# Patient Record
Sex: MALE | Race: WHITE
[De-identification: names, ages, dates, MRNs, and addresses within clinical notes are randomized per-mention and may not be internally consistent; named-entity substitution may affect disease eponyms.]

---

## 2019-06-18 ENCOUNTER — HOSPITAL ENCOUNTER (INPATIENT)
Dept: HOSPITAL 74 - YASAS | Age: 39
LOS: 3 days | Discharge: HOME | DRG: 773 | End: 2019-06-21
Attending: SURGERY | Admitting: SURGERY
Payer: COMMERCIAL

## 2019-06-18 VITALS — BODY MASS INDEX: 24.1 KG/M2

## 2019-06-18 DIAGNOSIS — F14.10: ICD-10-CM

## 2019-06-18 DIAGNOSIS — F11.20: ICD-10-CM

## 2019-06-18 DIAGNOSIS — F13.230: ICD-10-CM

## 2019-06-18 DIAGNOSIS — F17.210: ICD-10-CM

## 2019-06-18 DIAGNOSIS — F10.230: Primary | ICD-10-CM

## 2019-06-18 DIAGNOSIS — F31.9: ICD-10-CM

## 2019-06-18 DIAGNOSIS — R55: ICD-10-CM

## 2019-06-18 DIAGNOSIS — F43.10: ICD-10-CM

## 2019-06-18 DIAGNOSIS — F15.10: ICD-10-CM

## 2019-06-18 PROCEDURE — HZ2ZZZZ DETOXIFICATION SERVICES FOR SUBSTANCE ABUSE TREATMENT: ICD-10-PCS | Performed by: SURGERY

## 2019-06-18 RX ADMIN — Medication SCH MG: at 21:47

## 2019-06-18 NOTE — HP
CIWA Score


Nausea/Vomitin-Mild Nausea/No Vomiting


Muscle Tremors: 2


Anxiety: 1-Mildly Anxious


Agitation: 3


Paroxysmal Sweats: 3


Orientation: 0-Oriented


Tacttile Disturbances: 0-None


Auditory Disturbances: 0-None


Visual Disturbances: 0-None


Headache: 2-Mild


CIWA-Ar Total Score: 12





- Admission Criteria


[facility] Guidelines: Admission for Medically Managed Detox: 


Requires at least one of the followin. CIWA greater than 12


2. Seizures within the past 24 hours


3. Delirium tremens within the past 24 hours


4. Hallucinations within the past 24 hours


5. Acute intervention needed for co  occurring medical disorder


6. Acute intervention needed for co  occurring psychiatric disorder


7. Severe withdrawal that cannot be handled at a lower level of care (continued


    vomiting, continued diarrhea, abnormal vital signs) requiring intravenous


    medication and/or fluids


8. Pregnancy





Patient presents the following: CIWA greater than 12


Admission Criteria Met: Admission criteria met





Admission ROS [facility]


Chief Complaint: 





xanax, heroin detox





39 yo with no medical problems, no medications, last here in detox about 4 

years ago. Is in a methadone program. Pt relapsed about 2 months ago after wife 

passed of cancer. Since then has been using heroin, benzo, alcohol, amphetamines

, alcohol. Pt is self employed- computer sales.


Lives in [place] with uncle.








Heroin- 6-8 bags IV for 2 months, in methadone program- 80mg/day


Alcohol- 6 pack 1-2 a day, no seizures, no DT's


amphetamines- occ use 1/2 gram a day


benzo- xanax/klonopin- $10-15/day





DUR- no current meds








Allergies/Adverse Reactions: 


 Allergies











Allergy/AdvReac Type Severity Reaction Status Date / Time


 


Penicillins Allergy Unknown  Verified 19 18:59














- Ebola screening


Have you traveled outside of the country in the last 21 days: No


Have you had contact with anyone from an Ebola affected area: No





Patient History





- Patient Medical History


Hx Anemia: No


Hx Asthma: No


Hx Chronic Obstructive Pulmonary Disease (COPD): No


Hx Cancer: No


Hx Cardiac Disorders: No


Hx Congestive Heart Failure: No


Hx Hypertension: No


Hx Hypercholesterolemia: No


Hx Pacemaker: No


HX Cerebrovascular Accident: No


Hx Seizures: No (but h/o blackouts -several weeks ago)


Hx Dementia: No


Hx Diabetes: No


Hx Gastrointestinal Disorders: No


Hx Liver Disease: No


Hx Genitourinary Disorders: No


Hx Sexually Transmitted Disorders: No


Hx Renal Disease (ESRD): No


Hx Thyroid Disease: No


Hx Human Immunodeficiency Virus (HIV): No


Hx Hepatitis C: No


Hx Depression: Yes (ptsd)


Hx Suicide Attempt: No


Hx Bipolar Disorder: Yes


Hx Schizophrenia: No





- Patient Surgical History


Past Surgical History: Yes


Other Surgical History: rt varicose vein surgery / h/o rt jaw fx 





- PPD History


Documented Results: Negative w/o proof


Date: 05/28/15





- Smoking Cessation


Smoking history: Current every day smoker


Have you smoked in the past 12 months: Yes


Aproximately how many cigarettes per day: 20


Cigars Per Day: 0


Hx Chewing Tobacco Use: No


Initiated information on smoking cessation: Yes


'Breaking Loose' booklet given: 19





- Substance & Tx. History


Hx Alcohol Use: Yes


Hx Substance Use: Yes


Substance Use Type: Alcohol, Heroin, Opiates, Tranquilizers





- Substances abused


  ** Alcohol


Substance route: Oral


Frequency: Daily


Amount used: 2 6packs daily


Age of first use: 14


Date of last use: 19





  ** Alprazolam (Xanax)


Substance route: Oral


Frequency: Daily


Amount used: $10.00


Age of first use: 25


Date of last use: 19





  ** Heroin


Substance route: Injection


Frequency: Daily


Amount used: $60.00


Age of first use: 25


Date of last use: 19





  ** Benzodiazepine (Klonopin)


Substance route: Oral


Frequency: Daily


Amount used: $10.00


Age of first use: 25


Date of last use: 19





Family Disease History





- Family Disease History


Family Disease History: Other: Grandparent (alzheimers)





Admission Physical Exam [facility]





- Vital Signs


Vital Signs: 


 Vital Signs - 24 hr











  19





  19:07


 


Temperature 99.2 F


 


Pulse Rate 91 H


 


Respiratory 20





Rate 


 


Blood Pressure 116/70














Breathalyzer





- Breathalyzer


Breathalyzer: 0





Urine Drug Screen





- Test Device


Lot number: [id number]


Expiration date: 21





- Control


Is test valid?: Yes





- Results


Drug screen NEGATIVE: No


Urine drug screen results: MET-Methamphetamine, FEN-Fentanyl, MOP-Opiates, OXY-

Oxycodone, MTD-Methadone





Inpatient Rehab Admission





- Rehab Decision to Admit


Inpatient rehab admission?: No

## 2019-06-19 LAB
ALBUMIN SERPL-MCNC: 3.2 G/DL (ref 3.4–5)
ALP SERPL-CCNC: 114 U/L (ref 45–117)
ALT SERPL-CCNC: 37 U/L (ref 13–61)
ANION GAP SERPL CALC-SCNC: 6 MMOL/L (ref 8–16)
AST SERPL-CCNC: 27 U/L (ref 15–37)
BILIRUB SERPL-MCNC: 0.3 MG/DL (ref 0.2–1)
BUN SERPL-MCNC: 11.1 MG/DL (ref 7–18)
CALCIUM SERPL-MCNC: 8.5 MG/DL (ref 8.5–10.1)
CHLORIDE SERPL-SCNC: 108 MMOL/L (ref 98–107)
CO2 SERPL-SCNC: 28 MMOL/L (ref 21–32)
CREAT SERPL-MCNC: 0.8 MG/DL (ref 0.55–1.3)
DEPRECATED RDW RBC AUTO: 15.9 % (ref 11.9–15.9)
GLUCOSE SERPL-MCNC: 98 MG/DL (ref 74–106)
HCT VFR BLD CALC: 37.2 % (ref 35.4–49)
HGB BLD-MCNC: 12.5 GM/DL (ref 11.7–16.9)
MCH RBC QN AUTO: 27.5 PG (ref 25.7–33.7)
MCHC RBC AUTO-ENTMCNC: 33.8 G/DL (ref 32–35.9)
MCV RBC: 81.5 FL (ref 80–96)
PLATELET # BLD AUTO: 173 K/MM3 (ref 134–434)
PMV BLD: 8.4 FL (ref 7.5–11.1)
POTASSIUM SERPLBLD-SCNC: 3.6 MMOL/L (ref 3.5–5.1)
PROT SERPL-MCNC: 6 G/DL (ref 6.4–8.2)
RBC # BLD AUTO: 4.56 M/MM3 (ref 4–5.6)
SODIUM SERPL-SCNC: 143 MMOL/L (ref 136–145)
WBC # BLD AUTO: 5.1 K/MM3 (ref 4–10)

## 2019-06-19 RX ADMIN — Medication SCH TAB: at 10:43

## 2019-06-19 RX ADMIN — Medication SCH MG: at 22:04

## 2019-06-19 RX ADMIN — METHADONE HYDROCHLORIDE SCH MG: 40 TABLET ORAL at 11:29

## 2019-06-19 RX ADMIN — NICOTINE SCH MG: 21 PATCH TRANSDERMAL at 10:43

## 2019-06-19 NOTE — PN
[facility] CIWA





- CIWA Score


Nausea/Vomitin


Muscle Tremors: None


Anxiety: 3


Agitation: 1-Slight > Activity


Paroxysmal Sweats: 3


Orientation: 0-Oriented


Tacttile Disturbances: 0-None


Auditory Disturbances: 2-Mild Harshness/Frighten


Visual Disturbances: 2-Mild Sensitivity


Headache: 0-None Present


CIWA-Ar Total Score: 13





BHS Progress Note (SOAP)


Subjective: 





Sweating, Anxious, Nausea, Poor Appetite.


Objective: 


PATIENT A & O X 3, OBSERVED AMBULATING ON UNIT UNASSISTED. IN NO ACUTE DISTRESS.





19 16:52


 Vital Signs











Temperature  97.7 F   19 13:18


 


Pulse Rate  92 H  19 13:18


 


Respiratory Rate  20   19 13:18


 


Blood Pressure  101/66   19 13:18


 


O2 Sat by Pulse Oximetry (%)      








 Laboratory Tests











  19





  07:30 07:30 07:30


 


WBC  5.1  


 


RBC  4.56  


 


Hgb  12.5  


 


Hct  37.2  


 


MCV  81.5  


 


MCH  27.5  


 


MCHC  33.8  


 


RDW  15.9  D  


 


Plt Count  173  


 


MPV  8.4  


 


Sodium   143 


 


Potassium   3.6 


 


Chloride   108 H 


 


Carbon Dioxide   28 


 


Anion Gap   6 L 


 


BUN   11.1 


 


Creatinine   0.8 


 


Est GFR (CKD-EPI)AfAm   132.27 


 


Est GFR (CKD-EPI)NonAf   114.12 


 


Random Glucose   98 


 


Calcium   8.5 


 


Total Bilirubin   0.3 


 


AST   27 


 


ALT   37 


 


Alkaline Phosphatase   114 


 


Total Protein   6.0 L 


 


Albumin   3.2 L 


 


RPR Titer    Nonreactive








LABS NOTED.


Assessment: 





19 16:53


WITHDRAWAL SYMPTOMS.


Plan: 





CONTINUE DETOX.


ENSURE PO FOR CALORIC SUPPLEMENTATION.

## 2019-06-20 RX ADMIN — Medication SCH MG: at 22:41

## 2019-06-20 RX ADMIN — METHADONE HYDROCHLORIDE SCH MG: 40 TABLET ORAL at 05:52

## 2019-06-20 RX ADMIN — NICOTINE SCH MG: 21 PATCH TRANSDERMAL at 10:53

## 2019-06-20 RX ADMIN — Medication SCH TAB: at 10:53

## 2019-06-20 NOTE — PN
S CIWA





- CIWA Score


Nausea/Vomitin-No Nausea/No Vomiting


Muscle Tremors: None


Anxiety: 3


Agitation: 0-Normal Activity


Paroxysmal Sweats: 3


Orientation: 0-Oriented


Tacttile Disturbances: 2-Mild Itch/Numbness/Burn


Auditory Disturbances: 0-None


Visual Disturbances: 1-Very Mild Sensitivity


Headache: 0-None Present


CIWA-Ar Total Score: 9





B[facility] Progress Note (SOAP)


Subjective: 





Sweating, Anxious, Fatigue.


Objective: 


PATIENT A & O X 3, OBSERVED AMBULATING ON UNIT UNASSISTED. IN NO ACUTE DISTRESS.





19 16:52


 Vital Signs











Temperature  98.4 F   19 06:21


 


Pulse Rate  69   19 06:21


 


Respiratory Rate  18   19 06:30


 


Blood Pressure  99/67   19 06:21


 


O2 Sat by Pulse Oximetry (%)      








 Laboratory Tests











  19





  07:30 07:30 07:30


 


WBC  5.1  


 


RBC  4.56  


 


Hgb  12.5  


 


Hct  37.2  


 


MCV  81.5  


 


MCH  27.5  


 


MCHC  33.8  


 


RDW  15.9  D  


 


Plt Count  173  


 


MPV  8.4  


 


Sodium   143 


 


Potassium   3.6 


 


Chloride   108 H 


 


Carbon Dioxide   28 


 


Anion Gap   6 L 


 


BUN   11.1 


 


Creatinine   0.8 


 


Est GFR (CKD-EPI)AfAm   132.27 


 


Est GFR (CKD-EPI)NonAf   114.12 


 


Random Glucose   98 


 


Calcium   8.5 


 


Total Bilirubin   0.3 


 


AST   27 


 


ALT   37 


 


Alkaline Phosphatase   114 


 


Total Protein   6.0 L 


 


Albumin   3.2 L 


 


RPR Titer    Nonreactive








LABS NOTED.


Assessment: 





19 16:53


WITHDRAWAL SYMPTOMS.


Plan: 





CONTINUE DETOX.


INCREASE DAILY PO FLUID / WATER INTAKE.





PATIENT SCHEDULED FOR D/C TOMORROW.

## 2019-06-21 VITALS — HEART RATE: 75 BPM | DIASTOLIC BLOOD PRESSURE: 67 MMHG | TEMPERATURE: 98.5 F | SYSTOLIC BLOOD PRESSURE: 100 MMHG

## 2019-06-21 RX ADMIN — Medication SCH TAB: at 11:03

## 2019-06-21 RX ADMIN — NICOTINE SCH: 21 PATCH TRANSDERMAL at 11:04

## 2019-06-21 RX ADMIN — METHADONE HYDROCHLORIDE SCH MG: 40 TABLET ORAL at 06:09

## 2019-06-21 NOTE — DS
[facility] Detox Discharge Summary


Admission Date: 


06/18/19





Discharge Date: 06/21/19





- History


Present History: Alcohol Dependence, Opioid Dependence, Sedative Dependence, 

MMTP


Additional Comments: 





PATIENT RETURNING TO [facility] ([place], [place]), WHERE 

HE HAS PREVIOUSLY BEEN A CLIENT, FOR AFTERCARE. PATIENT ALSO ADVISED TO 

CONSIDER LOCAL 12-STEP / NA / [facility] OUTPATIENT SUPPORT GROUP PROGRAMS FOR 

AFTERCARE. PATIENT VERBALIZED UNDERSTANDING OF RECOMMENDATION. PATIENT WAS 

DISCHARGED FORM DETOX UNIT IN STABLE MEDICAL CONDITION.


Pertinent Past History: 





History Of Blackout, Depression, P.T.S.D., Bipolar Disorder, M.M.T.P.





- Physical Exam Results


Vital Signs: 


 Vital Signs











Temperature  98.5 F   06/21/19 07:45


 


Pulse Rate  75   06/21/19 07:45


 


Respiratory Rate  16   06/21/19 07:45


 


Blood Pressure  100/67   06/21/19 07:45


 


O2 Sat by Pulse Oximetry (%)      











Pertinent Admission Physical Exam Findings: 





WITHDRAWAL SYMPTOMS.





 Laboratory Tests











  06/19/19 06/19/19 06/19/19





  07:30 07:30 07:30


 


WBC  5.1  


 


RBC  4.56  


 


Hgb  12.5  


 


Hct  37.2  


 


MCV  81.5  


 


MCH  27.5  


 


MCHC  33.8  


 


RDW  15.9  D  


 


Plt Count  173  


 


MPV  8.4  


 


Sodium   143 


 


Potassium   3.6 


 


Chloride   108 H 


 


Carbon Dioxide   28 


 


Anion Gap   6 L 


 


BUN   11.1 


 


Creatinine   0.8 


 


Est GFR (CKD-EPI)AfAm   132.27 


 


Est GFR (CKD-EPI)NonAf   114.12 


 


Random Glucose   98 


 


Calcium   8.5 


 


Total Bilirubin   0.3 


 


AST   27 


 


ALT   37 


 


Alkaline Phosphatase   114 


 


Total Protein   6.0 L 


 


Albumin   3.2 L 


 


RPR Titer    Nonreactive








LABS NOTED.





- Treatment


Hospital Course: Detox Protocol Followed, Detoxed Safely, Responded well, 

Discharged Condition Good


Patient has Accepted a Rehab Referral to: PT. WILL RETURN TO PREVIOUS [facility] ([place], [place]





- Diagnosis


(1) LLOYD (blackout)


Status: Chronic   





(2) Cocaine abuse


Status: Chronic   





(3) Methadone maintenance therapy patient


Status: Chronic   





(4) Nicotine dependence


Status: Chronic   


Qualifiers: 


   Nicotine product type: cigarettes   Substance use status: uncomplicated   

Qualified Code(s): F17.210 - Nicotine dependence, cigarettes, uncomplicated   





(5) Alcohol dependence with uncomplicated withdrawal


Status: Acute   





(6) Sedative, hypnotic or anxiolytic dependence with withdrawal, uncomplicated


Status: Acute   





(7) Uncomplicated opioid dependence


Status: Acute   





- AMA


Did Patient Leave Against Medical Advice: No